# Patient Record
Sex: MALE | Race: WHITE
[De-identification: names, ages, dates, MRNs, and addresses within clinical notes are randomized per-mention and may not be internally consistent; named-entity substitution may affect disease eponyms.]

---

## 2014-09-22 VITALS
SYSTOLIC BLOOD PRESSURE: 138 MMHG | DIASTOLIC BLOOD PRESSURE: 85 MMHG | DIASTOLIC BLOOD PRESSURE: 85 MMHG | SYSTOLIC BLOOD PRESSURE: 138 MMHG | SYSTOLIC BLOOD PRESSURE: 138 MMHG | DIASTOLIC BLOOD PRESSURE: 85 MMHG | SYSTOLIC BLOOD PRESSURE: 138 MMHG | DIASTOLIC BLOOD PRESSURE: 85 MMHG

## 2017-07-07 ENCOUNTER — HOSPITAL ENCOUNTER (OUTPATIENT)
Dept: HOSPITAL 44 - LAB | Age: 37
End: 2017-07-07
Attending: PHYSICIAN ASSISTANT
Payer: MEDICARE

## 2017-07-07 DIAGNOSIS — F69: ICD-10-CM

## 2017-07-07 DIAGNOSIS — Z51.81: Primary | ICD-10-CM

## 2017-07-07 LAB
BASOPHILS NFR BLD: 0.7 % (ref 0–1.5)
EGFR (AFRICAN): > 60
EGFR (NON-AFRICAN): > 60
EOSINOPHIL NFR BLD: 0.8 % (ref 0–6.8)
MCH RBC QN AUTO: 33.3 PG (ref 28–34)
MCV RBC AUTO: 94.2 FL (ref 80–100)
MONOCYTES %: 6.8 % (ref 0–11)
NEUTROPHILS #: 1.8 # K/UL (ref 1.4–7.7)
PH UR STRIP: 6 [PH] (ref 5–8)
UROBILINOGEN URINE: 0.2 EU (ref 0.2–1)

## 2017-07-07 PROCEDURE — 80164 ASSAY DIPROPYLACETIC ACD TOT: CPT

## 2017-07-07 PROCEDURE — 36415 COLL VENOUS BLD VENIPUNCTURE: CPT

## 2017-07-07 PROCEDURE — 80053 COMPREHEN METABOLIC PANEL: CPT

## 2017-07-07 PROCEDURE — 85025 COMPLETE CBC W/AUTO DIFF WBC: CPT

## 2017-07-07 PROCEDURE — 81002 URINALYSIS NONAUTO W/O SCOPE: CPT

## 2017-10-23 ENCOUNTER — HOSPITAL ENCOUNTER (OUTPATIENT)
Dept: HOSPITAL 44 - LAB | Age: 37
End: 2017-10-23
Attending: FAMILY MEDICINE
Payer: MEDICARE

## 2017-10-23 DIAGNOSIS — Z79.899: ICD-10-CM

## 2017-10-23 DIAGNOSIS — E78.1: ICD-10-CM

## 2017-10-23 DIAGNOSIS — E03.9: Primary | ICD-10-CM

## 2017-10-23 PROCEDURE — 84443 ASSAY THYROID STIM HORMONE: CPT

## 2017-10-23 PROCEDURE — 80164 ASSAY DIPROPYLACETIC ACD TOT: CPT

## 2017-10-23 PROCEDURE — 36415 COLL VENOUS BLD VENIPUNCTURE: CPT

## 2017-10-23 PROCEDURE — 80061 LIPID PANEL: CPT

## 2018-01-31 ENCOUNTER — HOSPITAL ENCOUNTER (OUTPATIENT)
Dept: HOSPITAL 44 - RAD | Age: 38
End: 2018-01-31
Attending: PHYSICIAN ASSISTANT
Payer: MEDICARE

## 2018-01-31 DIAGNOSIS — S99.911A: ICD-10-CM

## 2018-01-31 DIAGNOSIS — M25.571: Primary | ICD-10-CM

## 2018-01-31 PROCEDURE — 73610 X-RAY EXAM OF ANKLE: CPT

## 2018-01-31 NOTE — DIAGNOSTIC IMAGING REPORT
USMAN CHAVARRIA 

[facility]

[address] [facility].[address] [address]

 

 

 

 

Report Submission Date: 2018 2:34:26 PM CST

Patient       Study

Name:   SHAHRZAD STEPHEN       Date:   2018 2:01:44 PM CST

MRN:   [id number]       Modality Type:   CR

Gender:   M       Description:   LOWER EXTREMITY

:   80       Institution:   [facility]

Physician:   USMAN CHAVARRIA

     Accession:    [id number]

 

 

Examination: Plain film ankle 



History: OLD ANKLE INJURY THAT REINJURED 2 WEEKS AGO; ANTERIOR ANKLE PAIN (Hx) 
/ DROPPED OBJECT ON ANKLE (DICOM Hx) / DROPPED OBJECT ON ANKLE (Pt comments) 



Findings: 3 views of the ankle demonstrates normal cortical margins. No 
fracture or dislocation. Talar dome is intact. Inferior and posterior calcaneal 
spurs. No soft tissue swelling. No joint effusion. 



Impression: Calcaneal spurs. No acute osseous process.

 

Electronically signed on 2018 2:34:26 PM CST by:

Nick CHACKO

## 2018-12-05 ENCOUNTER — HOSPITAL ENCOUNTER (OUTPATIENT)
Dept: HOSPITAL 44 - LAB | Age: 38
End: 2018-12-05
Attending: NURSE PRACTITIONER
Payer: MEDICARE

## 2018-12-05 DIAGNOSIS — D72.819: Primary | ICD-10-CM

## 2018-12-05 LAB
BASOPHILS NFR BLD: 0.3 % (ref 0–1.5)
EOSINOPHIL NFR BLD: 1.5 % (ref 0–6.8)
MCH RBC QN AUTO: 31.1 PG (ref 28–34)
MCV RBC AUTO: 93 FL (ref 80–100)
MONOCYTES %: 8.5 % (ref 0–11)
NEUTROPHILS #: 2.1 # K/UL (ref 1.4–7.7)

## 2018-12-05 PROCEDURE — 36415 COLL VENOUS BLD VENIPUNCTURE: CPT

## 2018-12-05 PROCEDURE — 85025 COMPLETE CBC W/AUTO DIFF WBC: CPT

## 2019-08-02 ENCOUNTER — HOSPITAL ENCOUNTER (OUTPATIENT)
Dept: HOSPITAL 44 - ED | Age: 39
Setting detail: OBSERVATION
LOS: 1 days | Discharge: HOME | End: 2019-08-03
Attending: NURSE PRACTITIONER | Admitting: NURSE PRACTITIONER
Payer: MEDICARE

## 2019-08-02 VITALS — BODY MASS INDEX: 30.2 KG/M2

## 2019-08-02 DIAGNOSIS — R07.9: Primary | ICD-10-CM

## 2019-08-02 DIAGNOSIS — I10: ICD-10-CM

## 2019-08-02 PROCEDURE — S1016 NON-PVC INTRAVENOUS ADMINIST: HCPCS

## 2019-08-02 PROCEDURE — 85025 COMPLETE CBC W/AUTO DIFF WBC: CPT

## 2019-08-02 PROCEDURE — G0378 HOSPITAL OBSERVATION PER HR: HCPCS

## 2019-08-02 PROCEDURE — 36415 COLL VENOUS BLD VENIPUNCTURE: CPT

## 2019-08-02 PROCEDURE — 84484 ASSAY OF TROPONIN QUANT: CPT

## 2019-08-02 PROCEDURE — 99234 HOSP IP/OBS SM DT SF/LOW 45: CPT

## 2019-08-02 PROCEDURE — 71046 X-RAY EXAM CHEST 2 VIEWS: CPT

## 2019-08-02 PROCEDURE — 82553 CREATINE MB FRACTION: CPT

## 2019-08-02 PROCEDURE — 80053 COMPREHEN METABOLIC PANEL: CPT

## 2019-08-02 PROCEDURE — 82550 ASSAY OF CK (CPK): CPT

## 2019-08-02 PROCEDURE — 93005 ELECTROCARDIOGRAM TRACING: CPT

## 2019-08-02 RX ADMIN — NITROGLYCERIN SCH MG: 0.4 TABLET SUBLINGUAL at 23:10

## 2019-08-02 RX ADMIN — NITROGLYCERIN SCH MG: 0.4 TABLET SUBLINGUAL at 23:03

## 2019-08-02 RX ADMIN — NITROGLYCERIN SCH MG: 0.4 TABLET SUBLINGUAL at 23:18

## 2019-08-02 NOTE — ED PHYSICIAN DOCUMENTATION
Chest Pain





- HISTORIAN


Historian: patient, other (Caregiver)





- HPI


Stated Complaint: High Blood Pressure


Chief Complaint: Chest Pain (HTN)


Additional Information: 


Patient is a 38-year-old male that presents to the ER with caregiver from 

[employer]. Patient has a cardiac history with pacemaker and 

occasionally will experience elevated blood pressures.  RN that oversees the 

residents noted blood pressure to be elevated and sent patient out for 

evaluation. Patient has MR but is able to point to sternum when describing chest

pain- he states that it goes into his neck.  He states that it started earlier 

today and "just does not feel well".  He ate at [employer] today- denies feeling 

sick to his stomach.  No diaphoresis.  Caregiver states that patient is usually 

very happy and very active but has just been sitting around this evening. Blood 

pressure PTA by RN at 19:00 was 174/100, manually taken in the ER was 180/122.


Onset: hours


Timing: gradual onset, still present


Duration: gradual


Last known Well Date: 08/02/19


Last Known Well Time: 22:45 (Unsure of time)


Last known Well Code/Unknown Code: Unknown


Context: activity


Severity: mild


Quality: other ("just hurts"- reproducible)


Chest Pain Radiation: other (Points at his throat when describing radiating 

pain)


Chest Pain Signs/Symptoms: denies: nausea, vomiting, diaphoresis, dyspnea, 

tachycardia


Worsened By: nothing


Relieved By: nothing





- ROS


CONST: none


MS/LYMPH: none


GI/: none


EYES/ENT: none


SKIN/ENDO: none


NEURO/PSYCH: none





- PAST HX


MI risk factors: hypertension, hyperlipidemia, other (hypothyroid)


DVT/PE Risk Factors: none


TAD/AAA risk factors: none


Neuro deficit: other (depression, schizophrenia, anxiety, ADHD, Explosive 

disorder)


GI disease: GERD


Lung disease: other (Uses inhaler prn)


Surgeries/Procedures: pacemaker


Immunizations: UTD


Allergies/Adverse Reactions: 


                                    Allergies











Allergy/AdvReac Type Severity Reaction Status Date / Time


 


No Known Allergies Allergy   Verified 08/02/19 22:38











Home Medications: 


                                Ambulatory Orders











 Medication  Instructions  Recorded


 


Haloperidol [Haldol] 5 mg PO DAILY 08/21/13














- SOCIAL HX


Smoking History: quit greater than 1 year


Alcohol Use: none


Drug Use: none





- FAMILY HX


Family HX: none, other (Adopted)





- VITAL SIGNS


Vital Signs: 





                                   Vital Signs











Temp Pulse Resp BP Pulse Ox


 


    61   20   190/122   94 


 


    08/02/19 22:04  08/02/19 22:04  08/02/19 22:04  08/02/19 22:04














- REVIEWED ASSESSMENTS


Nursing Assessment  Reviewed: Yes


Vitals Reviewed: Yes





Progress





- Progress


Progress: 


23:08 Blood pressure down to 135/91 after 2nd nitro; pain down from 10 to 5





00:05 patient appears to be resting comfortably; chest pain has improved; he is 

drinking a tiffani





ED Results Lab/Radiology





- Lab Results


Lab Results: 


WBC 5.6, Hgb 13.6, HCT 39.9, Platelets 160


Na 139, K+ 4.2, Cl 98, CO2 34, Glucose 114, BUN/Cr 17/0.69, Trop 0.012, , 

CKMB 1.0





- Radiology


Radiology Impressions: 


Pa and lateral chest 


Clinical history :short of breath 


Technique pa and lateral upright 


Findings: The lung fields show a linear atelectasis or scarring in the right 

upper lobe otherwise clear lung fields. The I see no hilar or mediastinal mass. 

There is no pleural effusion or lesion of the bony thorax.. There is a left-

sided pacemaker with a single lead. The tip of the late overlies the right 

atrium 





Impression: No acute pulmonary disease 


Right atrial pacemaker








- Orders


Orders: 





                                    ED Orders











 Category Date Time Status


 


 Continuous EKG monitoring Q30M Care  08/02/19 22:25 Active


 


 Continuous Pulse Oximetry Q30M Care  08/02/19 22:25 Active


 


 Place IV Lock 1T Care  08/02/19 22:25 Active


 


 CHEST 1VIEW [RAD] Stat Exams  08/02/19 Ordered


 


 CBC/PLATELET/DIFF Routine Lab  08/02/19 22:25 Ordered


 


 CKMB Stat Lab  08/02/19 Ordered


 


 CMP Routine Lab  08/02/19 22:25 Ordered


 


 CREATINE KINASE Routine Lab  08/02/19 22:25 Ordered


 


 TROPONIN I Stat Lab  08/02/19 Ordered


 


 Aspirin [Yanet] Med  08/02/19 22:25 Discontinued





 324 mg PO NOW ONE   


 


 Nitroglycerin [Nitroquick] Med  08/02/19 23:00 Ordered





 0.4 mg SL Q5M   


 


 Oxygen Daily Oxygen  08/02/19 22:30 Ordered


 


 EKG WITH COMPARISON Stat Ther  08/02/19 22:25 Ordered














Chest Pain Physical Exam





- EXAM


General Appearance: alert, mild distress


EENT: eye inspection normal, ENT inspection normal, DIANNE


Neck: nml inspection, no carotid bruit


Respiratory: no resp. distress, nml breath sounds


CVS: no murmur, no gallop, no friction rub, pulses full, pulses equal, other 

(paced)


Abdomen: soft, normal bowel sounds


Extremities: non-tender, normal range of motion, no evidence of injury, no edema


Neuro: oriented X3 (Appropriate for disability), motor nml, sensation nml, 

mood/affect nml





Discharge


Clincal Impression: 


 Chest pain, Hypertension





Referrals: 


Deja Sinclair [Primary Care Provider] - 2 Days


Additional Instructions: 


Will admit patient to observation r/o cardiac event


Condition: Good


Decision to Admit: [id number]


Date of Decison to Admit: 08/03/19


Decision Time: 00:25

## 2019-08-03 VITALS
SYSTOLIC BLOOD PRESSURE: 161 MMHG | SYSTOLIC BLOOD PRESSURE: 161 MMHG | DIASTOLIC BLOOD PRESSURE: 101 MMHG | DIASTOLIC BLOOD PRESSURE: 101 MMHG | DIASTOLIC BLOOD PRESSURE: 101 MMHG | SYSTOLIC BLOOD PRESSURE: 161 MMHG

## 2019-08-03 LAB
BASOPHILS NFR BLD: 0.2 % (ref 0–1.5)
BASOPHILS NFR BLD: 0.3 % (ref 0–1.5)
EGFR (NON-AFRICAN): > 60
EGFR (NON-AFRICAN): > 60
MCV RBC AUTO: 94 FL (ref 80–100)
MCV RBC AUTO: 95 FL (ref 80–100)
NEUTROPHILS #: 2.6 # K/UL (ref 1.4–7.7)
NEUTROPHILS #: 3.8 # K/UL (ref 1.4–7.7)

## 2019-08-03 RX ADMIN — PROPRANOLOL HYDROCHLORIDE SCH MG: 20 TABLET ORAL at 06:53

## 2019-08-03 RX ADMIN — PROPRANOLOL HYDROCHLORIDE SCH MG: 20 TABLET ORAL at 01:30

## 2019-08-03 NOTE — DIAGNOSTIC IMAGING REPORT
MATTHIAS SANTACRUZ ED 

[facility]

[address] [address] [address]

 

 

 

 

Report Submission Date: Aug 2, 2019 11:43:57 PM CDT

Patient       Study

Name:   SHAHRZAD STEPHEN       Date:   Aug 2, 2019 11:16:49 PM CDT

MRN:   [id number]       Modality Type:   DX

Gender:   M       Description:   CHEST 2VIEW

:   80       Institution:   [facility]

Physician:   MATTHIAS SANTACRUZ ED

     Accession:    [id number]

 

 

Pa and lateral chest 





Clinical history :short of breath 





Technique pa and lateral upright 





Findings: The lung fields show a linear atelectasis or scarring in the right 
upper lobe otherwise clear lung fields. The I see no hilar or mediastinal mass. 
There is no pleural effusion or lesion of the bony thorax.. There is a left-
sided pacemaker with a single lead. The tip of the late overlies the right 
atrium 





Impression: No acute pulmonary disease 

Right atrial pacemaker

 

Electronically signed on Aug 2, 2019 11:43:57 PM CDT by:

Rush CHACKO

## 2019-08-03 NOTE — INPATIENT PROGRESS NOTE
Subjective





- Required Recertification Statement


I anticipate X number of days because-include discharge plan: 0





- Review of Systems


Events since last encounter: 


Repeat trop and ekg negative at 5 a.m./ trop & EKG scheduled for 11 a.m.- if 

negative will discharge patient home


patient feeling much better but does not want to go home today- wants to hang 

out at the hospital- explained that heart was showing no sx's of injury and he 

would be able to go home- patient has MR and states he likes it here; everyone 

is really nice.


General: Denies: Chills, Night Sweats


HEENT: Denies: Head Aches, Dysphasia


Pulmonary: Denies: Dyspnea, Cough, Pleuritic Chest Pain


Cardiovascular: Denies: Chest Pain


Gastrointestinal: Denies: Nausea, Vomiting


Musculoskeletal: Denies: Back Pain


Neurological: Denies: Weakness





Objective





- Exam


Vitals and I&O: 


                                   Vital Signs











Temp  97.7 F   08/03/19 05:35


 


Pulse  74   08/03/19 06:25


 


Resp  20   08/03/19 05:35


 


BP  166/97   08/03/19 05:35


 


Pulse Ox  97   08/03/19 06:25











                                 Intake & Output











 08/02/19 08/02/19 08/03/19





 11:59 23:59 11:59


 


Intake Total   0


 


Balance   0


 


Weight  100.698 kg 98.43 kg


 


Intake:   


 


  IV   0


 


    Left Antecubital   0


 


Other:   


 


  Voiding Method   Toilet














General: Alert, Oriented to Person, Oriented to Place, Cooperative, No acute 

distress


HEENT: PERRLA, Mouth Mucous membr. moist/Pink, Nose Mucous membr. moist/Pink


Neck: Supple, No JVD, +2 carotid pulse wo bruit


Lungs: Clear to auscultation, Normal air movement, Speaks full Sentences


Cardiovascular: Regular rate, Normal S1, Normal S2


Abdomen: Normal bowel sounds, Soft, No tenderness


Extremities: Normal pulses, No tenderness/swelling


Skin: Normal, Pink, Warm, Dry


Neurological: Normal gait, Normal speech, Strength Equal Bilat


Psych/Mental Status: Mental status NL (appropriate for disability), Mood NL, 

Appropriate Affect, Intact Judgment





- Results


Results: 


                               Laboratory Results











WBC  6.10 K/ul (4.00-12.00)   08/03/19  05:40    


 


RBC  4.46 M/ul (3.90-5.20)   08/03/19  05:40    


 


Hgb  14.2 g/dL (12.0-18.0)   08/03/19  05:40    


 


Hct  42.1 % (37.0-53.0)   08/03/19  05:40    


 


MCV  95.0 fl (80.0-100.0)   08/03/19  05:40    


 


MCH  31.8 pg (28.0-34.0)   08/03/19  05:40    


 


MCHC  33.6 g/dL (30.0-36.0)   08/03/19  05:40    


 


RDW  12.6 % (11.3-14.3)   08/03/19  05:40    


 


Plt Count  156 K/mm3 (130-400)   08/03/19  05:40    


 


Neut % (Auto)  61.2 % (39.0-79.0)   08/03/19  05:40    


 


Lymph % (Auto)  30.9 % (16.0-50.0)   08/03/19  05:40    


 


Mono % (Auto)  6.8 % (0.0-11.0)   08/03/19  05:40    


 


Eos % (Auto)  0.9 % (0.0-6.8)   08/03/19  05:40    


 


Baso % (Auto)  0.2 % (0.0-1.5)   08/03/19  05:40    


 


Neut # (Auto)  3.8 # k/uL (1.4-7.7)   08/03/19  05:40    


 


Lymph # (Auto)  1.9 # k/uL (0.6-4.0)   08/03/19  05:40    


 


Mono # (Auto)  0.4 # k/uL (0.0-0.9)   08/03/19  05:40    


 


Eos # (Auto)  0.1 # k/uL (0.0-0.6)   08/03/19  05:40    


 


Baso # (Auto)  0.0 # k/uL (0.0-0.5)   08/03/19  05:40    


 


Sodium  139 mmol/L (137-145)   08/03/19  05:40    


 


Potassium  4.3 mmol/L (3.5-5.1)   08/03/19  05:40    


 


Chloride  101 mmol/L ()   08/03/19  05:40    


 


Carbon Dioxide  30 mmol/L (22-30)   08/03/19  05:40    


 


Anion Gap  12.3 mmol/L (3-11)  H  08/03/19  05:40    


 


BUN  12 mg/dL (9-20)   08/03/19  05:40    


 


Creatinine  0.60 mg/dL (0.66-1.25)  L  08/03/19  05:40    


 


Estimated Creat Clear  232   08/03/19  05:40    


 


Est GFR ( Amer)  > 60  (60-)  08/03/19  05:40    


 


Est GFR (Non-Af Amer)  > 60  (60-)  08/03/19  05:40    


 


Glucose  140 mg/dL ()  H  08/03/19  05:40    


 


Calcium  9.3 mg/dL (8.4-10.2)   08/03/19  05:40    


 


Total Bilirubin  0.3 mg/dL (0.2-1.3)   08/03/19  05:40    


 


AST  50 U/L (15-46)  H  08/03/19  05:40    


 


ALT  46 U/L (13-69)   08/03/19  05:40    


 


Alkaline Phosphatase  80 U/L ()   08/03/19  05:40    


 


Creatine Kinase  106 U/L ()   08/02/19  22:45    


 


CK-MB (CK-2)  1.0 ng/mL (0.0-5.6)   08/02/19  22:45    


 


Troponin I  < 0.012 ng/mL (0.012-0.034)  L  08/03/19  05:40    


 


Total Protein  7.6 g/dL (6.3-8.2)   08/03/19  05:40    


 


Albumin  4.5 g/dL (3.5-5.0)   08/03/19  05:40    

















Assessment/Plan





- Assessment/Plan


(1) Chest pain


Status: Acute   


Assessment: 


Denies any chest pain


Plan: 


Continue with serial cardiac enzymes and EKG








(2) Hypertension


Status: Acute   


Assessment: 


Stable


Plan: 


Continue with home meds

## 2019-08-04 NOTE — DISCHARGE SUMMARY
Discharge Summary





- Discharge [facility]


Admission Date: 08/02/19


Discharge Date: 08/03/19


Discharge To: Home


History of Present Illness: 


Patient is a 38-year-old male that presents to the ER with caregiver from 

[employer]. Patient has a cardiac history with pacemaker and 

occasionally will experience elevated blood pressures.  RN that oversees the 

residents noted blood pressure to be elevated and sent patient out for 

evaluation. Patient has MR but is able to point to sternum when describing chest

pain- he states that it goes into his neck.  He states that it started earlier 

today and "just does not feel well".  He ate [place] [employer] [email or web address] today- denies feeling 

sick to his stomach.  No diaphoresis.  Caregiver states that patient is usually 

very happy and very active but has just been sitting around this evening. Blood 

pressure PTA by RN at 19:00 was 174/100, manually taken in the ER was 180/122.


Condition at Discharge: Stable


Home Medications: 


                                Ambulatory Orders











 Medication  Instructions  Recorded


 


Haloperidol [Haldol] 5 mg PO DAILY 08/21/13


 


Albuterol Sulfate [Ventolin HFN]  08/03/19


 


Divalproex Sodium [Depakote ER] 500 mg QID 08/03/19


 


Guaifenesin [Mucinex] 600 mg BID PRN 08/03/19


 


Linaclotide [Linzess] 145 mg DAILY 08/03/19


 


Quetiapine Fumarate [Seroquel] 300 mg BID 08/03/19


 


Sertraline HCl [Zoloft] 50 mg DAILY 08/03/19


 


clonazePAM [Klonopin] 1 mg 08/03/19











Consultations this Visit: None


Procedures this Visit: None


Allergies/Adverse Reactions: 


                                    Allergies











Allergy/AdvReac Type Severity Reaction Status Date / Time


 


No Known Allergies Allergy   Verified 08/02/19 22:38











Discharge Summary: 


Patient is a 38-year-old male that was admitted observation for reproducible 

chest pain and hypertension.  Patient denies any chest pain, shortness of 

breath, or headache.  He feels much better.  Cardiac enzymes and EKG within 

normal limits.  patient will be discharged home today with caregiver.  Patient 

is to follow up with PCP this upcoming week.








- Final Diagnosis


(1) Chest pain


Problems: 


Stable


Right or Left: Right   





(2) Hypertension


Problems: 


Stable





Right or Left: Right

## 2019-10-24 ENCOUNTER — HOSPITAL ENCOUNTER (OUTPATIENT)
Dept: HOSPITAL 44 - LAB | Age: 39
End: 2019-10-24
Attending: NURSE PRACTITIONER
Payer: MEDICARE

## 2019-10-24 DIAGNOSIS — D72.820: Primary | ICD-10-CM

## 2019-10-24 PROCEDURE — 86308 HETEROPHILE ANTIBODY SCREEN: CPT

## 2019-10-24 PROCEDURE — 36415 COLL VENOUS BLD VENIPUNCTURE: CPT

## 2019-11-11 ENCOUNTER — HOSPITAL ENCOUNTER (OUTPATIENT)
Dept: HOSPITAL 44 - LAB | Age: 39
End: 2019-11-11
Attending: NURSE PRACTITIONER
Payer: MEDICARE

## 2019-11-11 DIAGNOSIS — N39.0: Primary | ICD-10-CM

## 2019-11-11 LAB
APPEARANCE UR: CLEAR
COLOR,URINE: YELLOW
PH UR STRIP: 7 [PH] (ref 5–8)
RBC UR QL: (no result)
UROBILINOGEN URINE: 1 EU (ref 0.2–1)

## 2019-11-11 PROCEDURE — 81002 URINALYSIS NONAUTO W/O SCOPE: CPT
